# Patient Record
Sex: MALE | Race: WHITE | NOT HISPANIC OR LATINO | Employment: OTHER
[De-identification: names, ages, dates, MRNs, and addresses within clinical notes are randomized per-mention and may not be internally consistent; named-entity substitution may affect disease eponyms.]

---

## 2017-01-10 ENCOUNTER — RECORDS - HEALTHEAST (OUTPATIENT)
Dept: ADMINISTRATIVE | Facility: OTHER | Age: 82
End: 2017-01-10

## 2017-01-19 ENCOUNTER — RECORDS - HEALTHEAST (OUTPATIENT)
Dept: ADMINISTRATIVE | Facility: OTHER | Age: 82
End: 2017-01-19

## 2017-01-31 ENCOUNTER — RECORDS - HEALTHEAST (OUTPATIENT)
Dept: ADMINISTRATIVE | Facility: OTHER | Age: 82
End: 2017-01-31

## 2017-02-09 ENCOUNTER — RECORDS - HEALTHEAST (OUTPATIENT)
Dept: ADMINISTRATIVE | Facility: OTHER | Age: 82
End: 2017-02-09

## 2017-02-09 ENCOUNTER — TRANSFERRED RECORDS (OUTPATIENT)
Dept: HEALTH INFORMATION MANAGEMENT | Facility: CLINIC | Age: 82
End: 2017-02-09

## 2017-02-17 ENCOUNTER — OFFICE VISIT - HEALTHEAST (OUTPATIENT)
Dept: RADIATION ONCOLOGY | Age: 82
End: 2017-02-17

## 2017-02-17 DIAGNOSIS — C61 PROSTATE CANCER (H): ICD-10-CM

## 2017-02-17 RX ORDER — LEVOTHYROXINE SODIUM 137 UG/1
137 TABLET ORAL
Status: SHIPPED | COMMUNITY
Start: 2017-02-15

## 2017-02-17 RX ORDER — PROBENECID 500 MG/1
500 TABLET, FILM COATED ORAL DAILY
Status: SHIPPED | COMMUNITY
Start: 2016-08-01

## 2017-02-17 RX ORDER — WARFARIN SODIUM 2 MG/1
2 TABLET ORAL DAILY
Status: SHIPPED | COMMUNITY
Start: 2017-02-15

## 2017-02-17 RX ORDER — FUROSEMIDE 20 MG
40 TABLET ORAL EVERY MORNING
Status: SHIPPED | COMMUNITY
Start: 2017-01-23

## 2017-02-17 ASSESSMENT — MIFFLIN-ST. JEOR: SCORE: 1520.76

## 2018-07-30 ENCOUNTER — AMBULATORY - HEALTHEAST (OUTPATIENT)
Dept: ADMINISTRATIVE | Facility: CLINIC | Age: 83
End: 2018-07-30

## 2018-07-30 RX ORDER — ACETAMINOPHEN 500 MG
1000 TABLET ORAL EVERY 8 HOURS PRN
Status: SHIPPED | COMMUNITY
Start: 2018-07-30

## 2018-07-30 RX ORDER — METOPROLOL SUCCINATE 100 MG/1
100 TABLET, EXTENDED RELEASE ORAL DAILY
Status: SHIPPED | COMMUNITY
Start: 2018-07-30

## 2018-07-30 RX ORDER — AMOXICILLIN 250 MG
2 CAPSULE ORAL 2 TIMES DAILY PRN
Status: SHIPPED | COMMUNITY
Start: 2018-07-30

## 2018-07-31 ENCOUNTER — RECORDS - HEALTHEAST (OUTPATIENT)
Dept: LAB | Facility: CLINIC | Age: 83
End: 2018-07-31

## 2018-07-31 ENCOUNTER — OFFICE VISIT - HEALTHEAST (OUTPATIENT)
Dept: GERIATRICS | Facility: CLINIC | Age: 83
End: 2018-07-31

## 2018-07-31 DIAGNOSIS — Z86.39 H/O HYPERPARATHYROIDISM: ICD-10-CM

## 2018-07-31 DIAGNOSIS — D64.9 ANEMIA, UNSPECIFIED TYPE: ICD-10-CM

## 2018-07-31 DIAGNOSIS — E03.9 ACQUIRED HYPOTHYROIDISM: ICD-10-CM

## 2018-07-31 DIAGNOSIS — M1A.9XX0 CHRONIC GOUT WITHOUT TOPHUS, UNSPECIFIED CAUSE, UNSPECIFIED SITE: ICD-10-CM

## 2018-07-31 DIAGNOSIS — S72.401D CLOSED FRACTURE OF DISTAL END OF RIGHT FEMUR WITH ROUTINE HEALING, UNSPECIFIED FRACTURE MORPHOLOGY, SUBSEQUENT ENCOUNTER: ICD-10-CM

## 2018-07-31 DIAGNOSIS — I48.20 CHRONIC ATRIAL FIBRILLATION (H): ICD-10-CM

## 2018-07-31 DIAGNOSIS — K21.9 GASTROESOPHAGEAL REFLUX DISEASE, ESOPHAGITIS PRESENCE NOT SPECIFIED: ICD-10-CM

## 2018-07-31 DIAGNOSIS — E11.9 TYPE 2 DIABETES MELLITUS WITHOUT COMPLICATION, WITHOUT LONG-TERM CURRENT USE OF INSULIN (H): ICD-10-CM

## 2018-07-31 DIAGNOSIS — J84.10 PULMONARY FIBROSIS (H): ICD-10-CM

## 2018-07-31 DIAGNOSIS — Z95.0 PACEMAKER: ICD-10-CM

## 2018-07-31 DIAGNOSIS — I10 HYPERTENSION: ICD-10-CM

## 2018-07-31 DIAGNOSIS — C61 PROSTATE CANCER (H): ICD-10-CM

## 2018-07-31 LAB — HGB BLD-MCNC: 7.9 G/DL (ref 14–18)

## 2018-08-02 ENCOUNTER — OFFICE VISIT - HEALTHEAST (OUTPATIENT)
Dept: GERIATRICS | Facility: CLINIC | Age: 83
End: 2018-08-02

## 2018-08-02 DIAGNOSIS — I50.32 CHRONIC DIASTOLIC HEART FAILURE (H): ICD-10-CM

## 2018-08-02 DIAGNOSIS — E11.9 DIABETES MELLITUS, TYPE 2 (H): ICD-10-CM

## 2018-08-02 DIAGNOSIS — R53.1 GENERALIZED WEAKNESS: ICD-10-CM

## 2018-08-02 DIAGNOSIS — K58.2 IRRITABLE BOWEL SYNDROME WITH BOTH CONSTIPATION AND DIARRHEA: ICD-10-CM

## 2018-08-02 DIAGNOSIS — I10 HYPERTENSION: ICD-10-CM

## 2018-08-02 DIAGNOSIS — Z95.0 PACEMAKER: ICD-10-CM

## 2018-08-02 DIAGNOSIS — S72.491A: ICD-10-CM

## 2018-08-02 DIAGNOSIS — R53.81 PHYSICAL DECONDITIONING: ICD-10-CM

## 2018-08-02 DIAGNOSIS — E03.9 HYPOTHYROIDISM: ICD-10-CM

## 2018-08-02 DIAGNOSIS — I48.20 CHRONIC ATRIAL FIBRILLATION (H): ICD-10-CM

## 2018-08-02 DIAGNOSIS — E21.3 HYPERPARATHYROIDISM (H): ICD-10-CM

## 2018-08-02 RX ORDER — LOPERAMIDE HCL 2 MG
2 CAPSULE ORAL 4 TIMES DAILY PRN
Status: SHIPPED | COMMUNITY
Start: 2018-08-02

## 2018-08-06 ENCOUNTER — OFFICE VISIT - HEALTHEAST (OUTPATIENT)
Dept: GERIATRICS | Facility: CLINIC | Age: 83
End: 2018-08-06

## 2018-08-06 ENCOUNTER — RECORDS - HEALTHEAST (OUTPATIENT)
Dept: LAB | Facility: CLINIC | Age: 83
End: 2018-08-06

## 2018-08-06 DIAGNOSIS — Z95.0 PACEMAKER: ICD-10-CM

## 2018-08-06 DIAGNOSIS — Z79.01 ANTICOAGULATED ON COUMADIN: ICD-10-CM

## 2018-08-06 DIAGNOSIS — D62 ACUTE BLOOD LOSS ANEMIA: ICD-10-CM

## 2018-08-06 DIAGNOSIS — E03.9 HYPOTHYROIDISM: ICD-10-CM

## 2018-08-06 DIAGNOSIS — I48.20 CHRONIC ATRIAL FIBRILLATION (H): ICD-10-CM

## 2018-08-06 DIAGNOSIS — I10 HYPERTENSION: ICD-10-CM

## 2018-08-06 DIAGNOSIS — I50.32 CHRONIC DIASTOLIC HEART FAILURE (H): ICD-10-CM

## 2018-08-06 DIAGNOSIS — E21.3 HYPERPARATHYROIDISM (H): ICD-10-CM

## 2018-08-06 DIAGNOSIS — M10.9 GOUT: ICD-10-CM

## 2018-08-06 DIAGNOSIS — R53.81 PHYSICAL DECONDITIONING: ICD-10-CM

## 2018-08-06 DIAGNOSIS — S72.491A: ICD-10-CM

## 2018-08-06 DIAGNOSIS — E11.9 DIABETES MELLITUS, TYPE 2 (H): ICD-10-CM

## 2018-08-07 ENCOUNTER — OFFICE VISIT - HEALTHEAST (OUTPATIENT)
Dept: GERIATRICS | Facility: CLINIC | Age: 83
End: 2018-08-07

## 2018-08-07 DIAGNOSIS — C61 PROSTATE CANCER (H): ICD-10-CM

## 2018-08-07 DIAGNOSIS — E11.9 TYPE 2 DIABETES MELLITUS WITHOUT COMPLICATION, WITHOUT LONG-TERM CURRENT USE OF INSULIN (H): ICD-10-CM

## 2018-08-07 DIAGNOSIS — I10 ESSENTIAL HYPERTENSION: ICD-10-CM

## 2018-08-07 DIAGNOSIS — D62 ACUTE BLOOD LOSS ANEMIA: ICD-10-CM

## 2018-08-07 DIAGNOSIS — Z95.0 PACEMAKER: ICD-10-CM

## 2018-08-07 DIAGNOSIS — S72.401D CLOSED FRACTURE OF DISTAL END OF RIGHT FEMUR WITH ROUTINE HEALING, UNSPECIFIED FRACTURE MORPHOLOGY, SUBSEQUENT ENCOUNTER: ICD-10-CM

## 2018-08-07 LAB
ANION GAP SERPL CALCULATED.3IONS-SCNC: 7 MMOL/L (ref 5–18)
BASOPHILS # BLD AUTO: 0 THOU/UL (ref 0–0.2)
BASOPHILS NFR BLD AUTO: 1 % (ref 0–2)
BUN SERPL-MCNC: 21 MG/DL (ref 8–28)
CALCIUM SERPL-MCNC: 11 MG/DL (ref 8.5–10.5)
CHLORIDE BLD-SCNC: 109 MMOL/L (ref 98–107)
CO2 SERPL-SCNC: 25 MMOL/L (ref 22–31)
CREAT SERPL-MCNC: 1.12 MG/DL (ref 0.7–1.3)
EOSINOPHIL # BLD AUTO: 0.2 THOU/UL (ref 0–0.4)
EOSINOPHIL NFR BLD AUTO: 2 % (ref 0–6)
ERYTHROCYTE [DISTWIDTH] IN BLOOD BY AUTOMATED COUNT: 16.3 % (ref 11–14.5)
GFR SERPL CREATININE-BSD FRML MDRD: >60 ML/MIN/1.73M2
GLUCOSE BLD-MCNC: 133 MG/DL (ref 70–125)
HCT VFR BLD AUTO: 30.5 % (ref 40–54)
HGB BLD-MCNC: 9.3 G/DL (ref 14–18)
LYMPHOCYTES # BLD AUTO: 0.6 THOU/UL (ref 0.8–4.4)
LYMPHOCYTES NFR BLD AUTO: 7 % (ref 20–40)
MCH RBC QN AUTO: 31 PG (ref 27–34)
MCHC RBC AUTO-ENTMCNC: 30.5 G/DL (ref 32–36)
MCV RBC AUTO: 102 FL (ref 80–100)
MONOCYTES # BLD AUTO: 0.8 THOU/UL (ref 0–0.9)
MONOCYTES NFR BLD AUTO: 9 % (ref 2–10)
NEUTROPHILS # BLD AUTO: 7 THOU/UL (ref 2–7.7)
NEUTROPHILS NFR BLD AUTO: 81 % (ref 50–70)
PLATELET # BLD AUTO: 302 THOU/UL (ref 140–440)
PMV BLD AUTO: 10.7 FL (ref 8.5–12.5)
POTASSIUM BLD-SCNC: 3.6 MMOL/L (ref 3.5–5)
RBC # BLD AUTO: 3 MILL/UL (ref 4.4–6.2)
SODIUM SERPL-SCNC: 141 MMOL/L (ref 136–145)
WBC: 8.7 THOU/UL (ref 4–11)

## 2018-08-09 ENCOUNTER — OFFICE VISIT - HEALTHEAST (OUTPATIENT)
Dept: GERIATRICS | Facility: CLINIC | Age: 83
End: 2018-08-09

## 2018-08-09 DIAGNOSIS — E11.9 DIABETES MELLITUS, TYPE 2 (H): ICD-10-CM

## 2018-08-09 DIAGNOSIS — I47.19 ATRIAL TACHYCARDIA (H): ICD-10-CM

## 2018-08-09 DIAGNOSIS — S72.491A: ICD-10-CM

## 2018-08-09 DIAGNOSIS — R53.81 PHYSICAL DECONDITIONING: ICD-10-CM

## 2018-08-09 DIAGNOSIS — I10 ESSENTIAL HYPERTENSION: ICD-10-CM

## 2018-08-09 DIAGNOSIS — E21.3 HYPERPARATHYROIDISM (H): ICD-10-CM

## 2018-08-09 DIAGNOSIS — I50.32 CHRONIC DIASTOLIC HEART FAILURE (H): ICD-10-CM

## 2018-08-09 DIAGNOSIS — D62 ACUTE BLOOD LOSS ANEMIA: ICD-10-CM

## 2018-08-09 RX ORDER — AMLODIPINE BESYLATE 2.5 MG/1
2.5 TABLET ORAL DAILY
Status: SHIPPED | COMMUNITY
Start: 2018-08-07

## 2018-08-10 ENCOUNTER — AMBULATORY - HEALTHEAST (OUTPATIENT)
Dept: GERIATRICS | Facility: CLINIC | Age: 83
End: 2018-08-10

## 2018-08-23 ENCOUNTER — RECORDS - HEALTHEAST (OUTPATIENT)
Dept: LAB | Facility: CLINIC | Age: 83
End: 2018-08-23

## 2018-08-23 LAB
ANION GAP SERPL CALCULATED.3IONS-SCNC: 7 MMOL/L (ref 5–18)
BUN SERPL-MCNC: 15 MG/DL (ref 8–28)
CALCIUM SERPL-MCNC: 11.4 MG/DL (ref 8.5–10.5)
CHLORIDE BLD-SCNC: 107 MMOL/L (ref 98–107)
CO2 SERPL-SCNC: 24 MMOL/L (ref 22–31)
CREAT SERPL-MCNC: 0.95 MG/DL (ref 0.7–1.3)
GFR SERPL CREATININE-BSD FRML MDRD: >60 ML/MIN/1.73M2
GLUCOSE BLD-MCNC: 108 MG/DL (ref 70–125)
POTASSIUM BLD-SCNC: 3.8 MMOL/L (ref 3.5–5)
SODIUM SERPL-SCNC: 138 MMOL/L (ref 136–145)

## 2018-09-04 ENCOUNTER — RECORDS - HEALTHEAST (OUTPATIENT)
Dept: LAB | Facility: CLINIC | Age: 83
End: 2018-09-04

## 2018-09-04 LAB
ALBUMIN UR-MCNC: ABNORMAL MG/DL
ANION GAP SERPL CALCULATED.3IONS-SCNC: 10 MMOL/L (ref 5–18)
APPEARANCE UR: ABNORMAL
BACTERIA #/AREA URNS HPF: ABNORMAL HPF
BASOPHILS # BLD AUTO: 0.1 THOU/UL (ref 0–0.2)
BASOPHILS NFR BLD AUTO: 1 % (ref 0–2)
BILIRUB UR QL STRIP: NEGATIVE
BUN SERPL-MCNC: 23 MG/DL (ref 8–28)
CALCIUM SERPL-MCNC: 12.9 MG/DL (ref 8.5–10.5)
CAOX CRY #/AREA URNS HPF: PRESENT /[HPF]
CHLORIDE BLD-SCNC: 105 MMOL/L (ref 98–107)
CO2 SERPL-SCNC: 26 MMOL/L (ref 22–31)
COLOR UR AUTO: YELLOW
CREAT SERPL-MCNC: 1 MG/DL (ref 0.7–1.3)
EOSINOPHIL # BLD AUTO: 0.1 THOU/UL (ref 0–0.4)
EOSINOPHIL NFR BLD AUTO: 1 % (ref 0–6)
ERYTHROCYTE [DISTWIDTH] IN BLOOD BY AUTOMATED COUNT: 14.4 % (ref 11–14.5)
GFR SERPL CREATININE-BSD FRML MDRD: >60 ML/MIN/1.73M2
GLUCOSE BLD-MCNC: 137 MG/DL (ref 70–125)
GLUCOSE UR STRIP-MCNC: NEGATIVE MG/DL
HCT VFR BLD AUTO: 39.6 % (ref 40–54)
HGB BLD-MCNC: 12.5 G/DL (ref 14–18)
HGB UR QL STRIP: ABNORMAL
HYALINE CASTS #/AREA URNS LPF: ABNORMAL LPF
KETONES UR STRIP-MCNC: NEGATIVE MG/DL
LEUKOCYTE ESTERASE UR QL STRIP: ABNORMAL
LYMPHOCYTES # BLD AUTO: 0.9 THOU/UL (ref 0.8–4.4)
LYMPHOCYTES NFR BLD AUTO: 10 % (ref 20–40)
MCH RBC QN AUTO: 31.5 PG (ref 27–34)
MCHC RBC AUTO-ENTMCNC: 31.6 G/DL (ref 32–36)
MCV RBC AUTO: 100 FL (ref 80–100)
MONOCYTES # BLD AUTO: 0.5 THOU/UL (ref 0–0.9)
MONOCYTES NFR BLD AUTO: 7 % (ref 2–10)
MUCOUS THREADS #/AREA URNS LPF: ABNORMAL LPF
NEUTROPHILS # BLD AUTO: 6.8 THOU/UL (ref 2–7.7)
NEUTROPHILS NFR BLD AUTO: 82 % (ref 50–70)
NITRATE UR QL: POSITIVE
PH UR STRIP: 5.5 [PH] (ref 4.5–8)
PLATELET # BLD AUTO: 187 THOU/UL (ref 140–440)
PMV BLD AUTO: 11.7 FL (ref 8.5–12.5)
POTASSIUM BLD-SCNC: 3.5 MMOL/L (ref 3.5–5)
RBC # BLD AUTO: 3.97 MILL/UL (ref 4.4–6.2)
RBC #/AREA URNS AUTO: ABNORMAL HPF
SODIUM SERPL-SCNC: 141 MMOL/L (ref 136–145)
SP GR UR STRIP: 1.02 (ref 1–1.03)
SQUAMOUS #/AREA URNS AUTO: ABNORMAL LPF
UROBILINOGEN UR STRIP-ACNC: ABNORMAL
WBC #/AREA URNS AUTO: >100 HPF
WBC CLUMPS #/AREA URNS HPF: PRESENT /[HPF]
WBC: 8.3 THOU/UL (ref 4–11)

## 2018-09-06 LAB — BACTERIA SPEC CULT: ABNORMAL

## 2018-09-19 ENCOUNTER — RECORDS - HEALTHEAST (OUTPATIENT)
Dept: LAB | Facility: CLINIC | Age: 83
End: 2018-09-19

## 2018-09-19 LAB
ANION GAP SERPL CALCULATED.3IONS-SCNC: 6 MMOL/L (ref 5–18)
BUN SERPL-MCNC: 17 MG/DL (ref 8–28)
CALCIUM SERPL-MCNC: 9.4 MG/DL (ref 8.5–10.5)
CALCIUM, IONIZED MEASURED: 1.31 MMOL/L (ref 1.11–1.3)
CHLORIDE BLD-SCNC: 111 MMOL/L (ref 98–107)
CO2 SERPL-SCNC: 22 MMOL/L (ref 22–31)
CREAT SERPL-MCNC: 0.94 MG/DL (ref 0.7–1.3)
GFR SERPL CREATININE-BSD FRML MDRD: >60 ML/MIN/1.73M2
GLUCOSE BLD-MCNC: 130 MG/DL (ref 70–125)
ION CA PH 7.4: 1.27 MMOL/L (ref 1.11–1.3)
PH: 7.33 (ref 7.35–7.45)
POTASSIUM BLD-SCNC: 3.5 MMOL/L (ref 3.5–5)
SODIUM SERPL-SCNC: 139 MMOL/L (ref 136–145)

## 2018-09-21 ENCOUNTER — RECORDS - HEALTHEAST (OUTPATIENT)
Dept: LAB | Facility: CLINIC | Age: 83
End: 2018-09-21

## 2018-09-21 LAB — 25(OH)D3 SERPL-MCNC: 10.5 NG/ML (ref 30–80)

## 2018-10-02 ENCOUNTER — RECORDS - HEALTHEAST (OUTPATIENT)
Dept: LAB | Facility: CLINIC | Age: 83
End: 2018-10-02

## 2018-10-02 LAB
ANION GAP SERPL CALCULATED.3IONS-SCNC: 5 MMOL/L (ref 5–18)
BUN SERPL-MCNC: 12 MG/DL (ref 8–28)
CALCIUM SERPL-MCNC: 9.7 MG/DL (ref 8.5–10.5)
CALCIUM, IONIZED MEASURED: 1.35 MMOL/L (ref 1.11–1.3)
CHLORIDE BLD-SCNC: 110 MMOL/L (ref 98–107)
CO2 SERPL-SCNC: 26 MMOL/L (ref 22–31)
CREAT SERPL-MCNC: 0.71 MG/DL (ref 0.7–1.3)
GFR SERPL CREATININE-BSD FRML MDRD: >60 ML/MIN/1.73M2
GLUCOSE BLD-MCNC: 107 MG/DL (ref 70–125)
HGB BLD-MCNC: 10.2 G/DL (ref 14–18)
ION CA PH 7.4: 1.36 MMOL/L (ref 1.11–1.3)
PH: 7.42 (ref 7.35–7.45)
POTASSIUM BLD-SCNC: 3.6 MMOL/L (ref 3.5–5)
SODIUM SERPL-SCNC: 141 MMOL/L (ref 136–145)

## 2018-10-09 ENCOUNTER — RECORDS - HEALTHEAST (OUTPATIENT)
Dept: LAB | Facility: CLINIC | Age: 83
End: 2018-10-09

## 2018-10-09 LAB
ANION GAP SERPL CALCULATED.3IONS-SCNC: 4 MMOL/L (ref 5–18)
BUN SERPL-MCNC: 14 MG/DL (ref 8–28)
CALCIUM SERPL-MCNC: 10.2 MG/DL (ref 8.5–10.5)
CHLORIDE BLD-SCNC: 108 MMOL/L (ref 98–107)
CO2 SERPL-SCNC: 28 MMOL/L (ref 22–31)
CREAT SERPL-MCNC: 0.92 MG/DL (ref 0.7–1.3)
GFR SERPL CREATININE-BSD FRML MDRD: >60 ML/MIN/1.73M2
GLUCOSE BLD-MCNC: 140 MG/DL (ref 70–125)
POTASSIUM BLD-SCNC: 3.8 MMOL/L (ref 3.5–5)
SODIUM SERPL-SCNC: 140 MMOL/L (ref 136–145)

## 2018-10-18 ENCOUNTER — RECORDS - HEALTHEAST (OUTPATIENT)
Dept: LAB | Facility: CLINIC | Age: 83
End: 2018-10-18

## 2018-10-18 LAB
ALBUMIN SERPL-MCNC: 3 G/DL (ref 3.5–5)
ALBUMIN UR-MCNC: ABNORMAL MG/DL
ANION GAP SERPL CALCULATED.3IONS-SCNC: 9 MMOL/L (ref 5–18)
APPEARANCE UR: CLEAR
BACTERIA #/AREA URNS HPF: ABNORMAL HPF
BASOPHILS # BLD AUTO: 0.1 THOU/UL (ref 0–0.2)
BASOPHILS NFR BLD AUTO: 1 % (ref 0–2)
BILIRUB UR QL STRIP: NEGATIVE
BUN SERPL-MCNC: 12 MG/DL (ref 8–28)
CALCIUM SERPL-MCNC: 10.6 MG/DL (ref 8.5–10.5)
CALCIUM SERPL-MCNC: 10.6 MG/DL (ref 8.5–10.5)
CHLORIDE BLD-SCNC: 105 MMOL/L (ref 98–107)
CO2 SERPL-SCNC: 24 MMOL/L (ref 22–31)
COLOR UR AUTO: YELLOW
CREAT SERPL-MCNC: 0.92 MG/DL (ref 0.7–1.3)
EOSINOPHIL # BLD AUTO: 0.1 THOU/UL (ref 0–0.4)
EOSINOPHIL NFR BLD AUTO: 2 % (ref 0–6)
ERYTHROCYTE [DISTWIDTH] IN BLOOD BY AUTOMATED COUNT: 15.1 % (ref 11–14.5)
GFR SERPL CREATININE-BSD FRML MDRD: >60 ML/MIN/1.73M2
GLUCOSE BLD-MCNC: 98 MG/DL (ref 70–125)
GLUCOSE UR STRIP-MCNC: NEGATIVE MG/DL
HCT VFR BLD AUTO: 36.1 % (ref 40–54)
HGB BLD-MCNC: 11.2 G/DL (ref 14–18)
HGB UR QL STRIP: NEGATIVE
HYALINE CASTS #/AREA URNS LPF: ABNORMAL LPF
KETONES UR STRIP-MCNC: NEGATIVE MG/DL
LEUKOCYTE ESTERASE UR QL STRIP: NEGATIVE
LYMPHOCYTES # BLD AUTO: 0.6 THOU/UL (ref 0.8–4.4)
LYMPHOCYTES NFR BLD AUTO: 10 % (ref 20–40)
MCH RBC QN AUTO: 30.6 PG (ref 27–34)
MCHC RBC AUTO-ENTMCNC: 31 G/DL (ref 32–36)
MCV RBC AUTO: 99 FL (ref 80–100)
MONOCYTES # BLD AUTO: 0.5 THOU/UL (ref 0–0.9)
MONOCYTES NFR BLD AUTO: 8 % (ref 2–10)
NEUTROPHILS # BLD AUTO: 4.6 THOU/UL (ref 2–7.7)
NEUTROPHILS NFR BLD AUTO: 80 % (ref 50–70)
NITRATE UR QL: NEGATIVE
PH UR STRIP: 6 [PH] (ref 4.5–8)
PHOSPHATE SERPL-MCNC: 2.1 MG/DL (ref 2.5–4.5)
PLATELET # BLD AUTO: 195 THOU/UL (ref 140–440)
PMV BLD AUTO: 10.7 FL (ref 8.5–12.5)
POTASSIUM BLD-SCNC: 3.6 MMOL/L (ref 3.5–5)
RBC # BLD AUTO: 3.66 MILL/UL (ref 4.4–6.2)
RBC #/AREA URNS AUTO: ABNORMAL HPF
SODIUM SERPL-SCNC: 138 MMOL/L (ref 136–145)
SP GR UR STRIP: 1.02 (ref 1–1.03)
SQUAMOUS #/AREA URNS AUTO: ABNORMAL LPF
UROBILINOGEN UR STRIP-ACNC: ABNORMAL
WBC #/AREA URNS AUTO: ABNORMAL HPF
WBC: 5.8 THOU/UL (ref 4–11)

## 2018-10-19 LAB — BACTERIA SPEC CULT: NO GROWTH

## 2018-10-24 ENCOUNTER — RECORDS - HEALTHEAST (OUTPATIENT)
Dept: LAB | Facility: CLINIC | Age: 83
End: 2018-10-24

## 2018-10-25 LAB
ANION GAP SERPL CALCULATED.3IONS-SCNC: 6 MMOL/L (ref 5–18)
BUN SERPL-MCNC: 12 MG/DL (ref 8–28)
CALCIUM SERPL-MCNC: 10.5 MG/DL (ref 8.5–10.5)
CHLORIDE BLD-SCNC: 105 MMOL/L (ref 98–107)
CO2 SERPL-SCNC: 25 MMOL/L (ref 22–31)
CREAT SERPL-MCNC: 0.94 MG/DL (ref 0.7–1.3)
GFR SERPL CREATININE-BSD FRML MDRD: >60 ML/MIN/1.73M2
GLUCOSE BLD-MCNC: 120 MG/DL (ref 70–125)
POTASSIUM BLD-SCNC: 3.5 MMOL/L (ref 3.5–5)
SODIUM SERPL-SCNC: 136 MMOL/L (ref 136–145)

## 2018-12-07 ENCOUNTER — RECORDS - HEALTHEAST (OUTPATIENT)
Dept: LAB | Facility: CLINIC | Age: 83
End: 2018-12-07

## 2018-12-07 LAB
ALBUMIN SERPL-MCNC: 2.8 G/DL (ref 3.5–5)
ALBUMIN SERPL-MCNC: 2.8 G/DL (ref 3.5–5)
ALBUMIN UR-MCNC: NEGATIVE MG/DL
ALP SERPL-CCNC: 98 U/L (ref 45–120)
ALP SERPL-CCNC: 98 U/L (ref 45–120)
ALT SERPL W P-5'-P-CCNC: 14 U/L (ref 0–45)
ALT SERPL W P-5'-P-CCNC: 14 U/L (ref 0–45)
ANION GAP SERPL CALCULATED.3IONS-SCNC: 8 MMOL/L (ref 5–18)
APPEARANCE UR: ABNORMAL
AST SERPL W P-5'-P-CCNC: 16 U/L (ref 0–40)
AST SERPL W P-5'-P-CCNC: 16 U/L (ref 0–40)
BACTERIA #/AREA URNS HPF: ABNORMAL HPF
BILIRUB DIRECT SERPL-MCNC: 0.7 MG/DL
BILIRUB SERPL-MCNC: 1.7 MG/DL (ref 0–1)
BILIRUB SERPL-MCNC: 1.7 MG/DL (ref 0–1)
BILIRUB UR QL STRIP: NEGATIVE
BUN SERPL-MCNC: 11 MG/DL (ref 8–28)
CALCIUM SERPL-MCNC: 11.1 MG/DL (ref 8.5–10.5)
CAOX CRY #/AREA URNS HPF: PRESENT /[HPF]
CHLORIDE BLD-SCNC: 103 MMOL/L (ref 98–107)
CO2 SERPL-SCNC: 27 MMOL/L (ref 22–31)
COLOR UR AUTO: YELLOW
CREAT SERPL-MCNC: 0.77 MG/DL (ref 0.7–1.3)
ERYTHROCYTE [DISTWIDTH] IN BLOOD BY AUTOMATED COUNT: 14.1 % (ref 11–14.5)
GFR SERPL CREATININE-BSD FRML MDRD: >60 ML/MIN/1.73M2
GLUCOSE BLD-MCNC: 131 MG/DL (ref 70–125)
GLUCOSE UR STRIP-MCNC: NEGATIVE MG/DL
HCT VFR BLD AUTO: 39 % (ref 40–54)
HGB BLD-MCNC: 12.7 G/DL (ref 14–18)
HGB UR QL STRIP: ABNORMAL
HYALINE CASTS #/AREA URNS LPF: ABNORMAL LPF
KETONES UR STRIP-MCNC: NEGATIVE MG/DL
LEUKOCYTE ESTERASE UR QL STRIP: NEGATIVE
MCH RBC QN AUTO: 31.9 PG (ref 27–34)
MCHC RBC AUTO-ENTMCNC: 32.6 G/DL (ref 32–36)
MCV RBC AUTO: 98 FL (ref 80–100)
MUCOUS THREADS #/AREA URNS LPF: ABNORMAL LPF
NITRATE UR QL: NEGATIVE
PH UR STRIP: 6 [PH] (ref 4.5–8)
PLATELET # BLD AUTO: 167 THOU/UL (ref 140–440)
PMV BLD AUTO: 11.9 FL (ref 8.5–12.5)
POTASSIUM BLD-SCNC: 2.9 MMOL/L (ref 3.5–5)
PROT SERPL-MCNC: 6.1 G/DL (ref 6–8)
PROT SERPL-MCNC: 6.1 G/DL (ref 6–8)
RBC # BLD AUTO: 3.98 MILL/UL (ref 4.4–6.2)
RBC #/AREA URNS AUTO: ABNORMAL HPF
SODIUM SERPL-SCNC: 138 MMOL/L (ref 136–145)
SP GR UR STRIP: 1.01 (ref 1–1.03)
SQUAMOUS #/AREA URNS AUTO: ABNORMAL LPF
UROBILINOGEN UR STRIP-ACNC: ABNORMAL
WBC #/AREA URNS AUTO: ABNORMAL HPF
WBC: 7.5 THOU/UL (ref 4–11)

## 2018-12-08 LAB — BACTERIA SPEC CULT: NO GROWTH

## 2018-12-11 ENCOUNTER — RECORDS - HEALTHEAST (OUTPATIENT)
Dept: LAB | Facility: CLINIC | Age: 83
End: 2018-12-11

## 2018-12-11 LAB
ANION GAP SERPL CALCULATED.3IONS-SCNC: 7 MMOL/L (ref 5–18)
BUN SERPL-MCNC: 12 MG/DL (ref 8–28)
CALCIUM SERPL-MCNC: 12.1 MG/DL (ref 8.5–10.5)
CHLORIDE BLD-SCNC: 109 MMOL/L (ref 98–107)
CO2 SERPL-SCNC: 26 MMOL/L (ref 22–31)
CREAT SERPL-MCNC: 0.73 MG/DL (ref 0.7–1.3)
GFR SERPL CREATININE-BSD FRML MDRD: >60 ML/MIN/1.73M2
GLUCOSE BLD-MCNC: 80 MG/DL (ref 70–125)
POTASSIUM BLD-SCNC: 4.3 MMOL/L (ref 3.5–5)
SODIUM SERPL-SCNC: 142 MMOL/L (ref 136–145)

## 2021-05-30 VITALS — HEIGHT: 70 IN | BODY MASS INDEX: 27.92 KG/M2 | WEIGHT: 195 LBS

## 2021-06-01 VITALS — BODY MASS INDEX: 27.69 KG/M2 | WEIGHT: 193 LBS

## 2021-06-01 VITALS — BODY MASS INDEX: 27.84 KG/M2 | WEIGHT: 194 LBS

## 2021-06-01 VITALS — BODY MASS INDEX: 27.98 KG/M2 | WEIGHT: 195 LBS

## 2021-06-08 NOTE — PROGRESS NOTES
Pt arrived ambulatory with use of cane accompanied by his spouse for consultation with Dr. Cohen.  He is here for discussion of CK tx for prostate cancer.  He reports urinary frequency and one episode of hematuria.  He denies any other changes in bowel or bladder.  Please see nrsg flow sheet for full assessment.  Pt was given new patient folder which included welcome letter, MD bio cards, writer's business card, managing fatigue leaflet, Danny Understanding RT, CK Accuray booklet, and CK pathway.  Pathway was explained in detail and pt and spouse verbalized their understanding.  They were encouraged to call CK RN line with any questions, concerns, or decisions.  Pt and spouse met with Dr. Cohen and he is considering his options at this time.  He will call if he wishes to proceed with tx.

## 2021-06-15 PROBLEM — C61 PROSTATE CANCER (H): Status: ACTIVE | Noted: 2017-02-17

## 2021-06-16 PROBLEM — M35.3 POLYMYALGIA RHEUMATICA (H): Status: ACTIVE | Noted: 2018-08-02

## 2021-06-16 PROBLEM — Z79.01 ANTICOAGULATED ON COUMADIN: Status: ACTIVE | Noted: 2018-07-22

## 2021-06-16 PROBLEM — M10.9 GOUT: Status: ACTIVE | Noted: 2018-08-02

## 2021-06-16 PROBLEM — D62 ACUTE BLOOD LOSS ANEMIA: Status: ACTIVE | Noted: 2018-07-28

## 2021-06-16 PROBLEM — I10 HYPERTENSION: Status: ACTIVE | Noted: 2018-08-02

## 2021-06-16 PROBLEM — E11.9 DIABETES MELLITUS, TYPE 2 (H): Status: ACTIVE | Noted: 2018-08-02

## 2021-06-16 PROBLEM — E03.9 HYPOTHYROIDISM: Status: ACTIVE | Noted: 2018-08-02

## 2021-06-16 PROBLEM — S72.491A: Status: ACTIVE | Noted: 2018-07-22

## 2021-06-16 PROBLEM — R00.1 BRADYCARDIA: Status: ACTIVE | Noted: 2018-07-22

## 2021-06-16 PROBLEM — R53.1 GENERALIZED WEAKNESS: Status: ACTIVE | Noted: 2018-06-04

## 2021-06-16 PROBLEM — I48.20 CHRONIC ATRIAL FIBRILLATION (H): Status: ACTIVE | Noted: 2018-07-22

## 2021-06-16 PROBLEM — K21.9 GERD (GASTROESOPHAGEAL REFLUX DISEASE): Status: ACTIVE | Noted: 2018-08-02

## 2021-06-16 PROBLEM — K58.9 IBS (IRRITABLE BOWEL SYNDROME): Status: ACTIVE | Noted: 2018-08-02

## 2021-06-16 PROBLEM — R53.81 PHYSICAL DECONDITIONING: Status: ACTIVE | Noted: 2018-08-02

## 2021-06-16 PROBLEM — Z95.0 PACEMAKER: Status: ACTIVE | Noted: 2018-08-02

## 2021-06-19 NOTE — PROGRESS NOTES
[facility]    DATE: 2018    NAME:  Hong Corley             :  1924  MRN: [id number]  CODE STATUS:  FULL CODE    VISIT TYPE: Discharge Summary     FACILITY:  [facility] [[id number]]       CHIEF COMPLAIN/REASON FOR VISIT:    Chief Complaint   Patient presents with     Discharge Summary               HISTORY OF PRESENT ILLNESS: Hong Corley is a 89 y.o. male Who was admitted to [facility]   -  for Fall and Right femur fracture.  On  he had significant bradycardia and was taken for urgent PPM placement by cardiology on .  On  he went to the OR for right knee ORIF.  He did have acute blood loss anemia postop with hemoglobin in 7 range.  He has PMH of HTN, HLD, type II DM managed with diet, atrial fibrillation, on chronic warfarin, hyperparathyroidism, thrombocytopenia, PMR, osteoarthritis GERD, DJD.  Prior to this he lived at home with his wife at [facility]. He is a retired physician.     TCU course:  Mr. Corley as made some progress with therapy but remains max assist of 2 for bed mobility, Navin lift for transfers, sitting balance min/mod upper body dressing set up, total assist for lower body dressing and cares.  Total assist for toileting. Therapy was recommending 3 - 4 weeks.  His pain has been controlled with Tylenol although a dose was scheduled for the a.m.prior to therapy. His blood sugars have been controlled 124 - 131. His vitals have been stable  He had a follow-up with ortho on  and x-rays showed stable fracture and continue nonweightbearing.  They did give okay for passive and active range of motion and PT exercises twice-daily with strengthening. He is to follow up again on .  His bowels have been moving regularly and weights are trending down 198 - 193 LBS. He remains on Lasix daily but he did report that is weight in the hospital was much higher than his baseline. He had labs drawn on  which showed  improvement in his hemoglobin to 9.3 other labs were stable. His wife has been renting an apartment in the [facility] living area so that she can be close to him.  However for unclear reasons his daughter is demanding he be transferred today to [facility].        REVIEW OF SYSTEMS:  PROBLEMS AND REVIEW OF SYSTEMS:   Review of Systems  Today on ROS:  Currently, no fever, chills, or rigors. Decreased vision and hearing. Denies any chest pain, headaches, palpitations, lightheadedness, dizziness, shortness of breath, or cough. Appetite is good. Denies any GERD symptoms. Denies any difficulty with swallowing, nausea, or vomiting.  Denies any abdominal pain, diarrhea or constipation. Denies any urinary symptoms. No insomnia. No active bleeding. No rash. Positive for right leg incision, knee immobilizer, right knee and leg pain-Controlled      Allergies   Allergen Reactions     Losartan Rash     Low platelets (severe)     Current Outpatient Prescriptions   Medication Sig     acetaminophen (TYLENOL) 500 MG tablet Take 1,000 mg by mouth every 8 (eight) hours as needed for pain. At 0600 scheduled     amLODIPine (NORVASC) 2.5 MG tablet Take 2.5 mg by mouth daily.     furosemide (LASIX) 20 MG tablet Take 40 mg by mouth every morning.     levothyroxine (SYNTHROID, LEVOTHROID) 137 MCG tablet Take 137 mcg by mouth Daily before breakfast.     loperamide (IMODIUM) 2 mg capsule Take 2 mg by mouth 4 (four) times a day as needed for diarrhea.     metoprolol succinate (TOPROL-XL) 100 MG 24 hr tablet Take 100 mg by mouth daily.     omeprazole (PRILOSEC) 20 MG capsule Take 40 mg by mouth every other day.      probenecid (BENEMID) 500 mg tablet Take 500 mg by mouth daily.     senna-docusate (SENNOSIDES-DOCUSATE SODIUM) 8.6-50 mg tablet Take 2 tablets by mouth 2 (two) times a day as needed for constipation.     warfarin (COUMADIN) 2 MG tablet Take 2 mg by mouth daily.      Past Medical History:    Past Medical History:    Diagnosis Date     Aortic stenosis      Atrial fibrillation (H)      Atrial tachycardia (H)      Bladder tumor      BPH (benign prostatic hyperplasia)      Bradycardia      CHF (congestive heart failure) (H)      Closed fracture of right distal femur (H)      Colon polyps      Diabetes mellitus (H)      DJD (degenerative joint disease)      GERD (gastroesophageal reflux disease)      Gout      Hx of polymyalgia rheumatica      Hyperlipidemia      Hyperparathyroidism (H)      Hypertension      Hypothyroidism      Meningioma (H)     Spine     Monoclonal gammopathy of undetermined significance      Peripheral neuropathy (H)      Prostate cancer (H)      Pulmonary fibrosis (H)      Sinus arrest      Thrombocytopenia (H)     secondary to losartan           PHYSICAL EXAMINATION  Vitals:    08/08/18 2201   BP: 92/53   Pulse: 60   Resp: 18   Temp: 96.6  F (35.9  C)   SpO2: 98%   Weight: 193 lb (87.5 kg)       Today on physical exam:    GENERAL: Awake, Alert, oriented x3, not in any form of acute distress, answers questions appropriately, follows simple commands, conversant  HEENT: Head is normocephalic with normal hair distribution. No evidence of trauma. Ears: No acute purulent discharge. Eyes: Conjunctivae pink with no scleral jaundice. Nose: Normal mucosa and septum. NECK: Supple with no cervical or supraclavicular lymphadenopathy. Trachea is midline.   CHEST: No tenderness or deformity, no crepitus  LUNG: dim to auscultation with good chest expansion. There are no crackles or wheezes, normal AP diameter.  BACK: No kyphosis of the thoracic spine. Symmetric, no curvature, ROM normal, no CVA tenderness, no spinal tenderness   CVS: There is good S1  S2, regular rhythm, there are no murmurs, rubs, gallops, or heaves,  2+ pulses symmetric in all extremities.  ABDOMEN: Rounded and soft, nontender to palpation, non distended, no masses, no organomegaly, good bowel sounds, no rebound or guarding, no peritoneal signs.    EXTREMITIES: No pedal edema, right leg incision C/D/I, knee immobilizer in place  SKIN: Warm and dry, no erythema noted.  Skin color, texture, no rashes or lesions.  NEUROLOGICAL: The patient is oriented to person, place and time. Strength and sensation are grossly intact. Face is symmetric.            LABS:   Recent Results (from the past 168 hour(s))   Basic Metabolic Panel   Result Value Ref Range    Sodium 141 136 - 145 mmol/L    Potassium 3.6 3.5 - 5.0 mmol/L    Chloride 109 (H) 98 - 107 mmol/L    CO2 25 22 - 31 mmol/L    Anion Gap, Calculation 7 5 - 18 mmol/L    Glucose 133 (H) 70 - 125 mg/dL    Calcium 11.0 (H) 8.5 - 10.5 mg/dL    BUN 21 8 - 28 mg/dL    Creatinine 1.12 0.70 - 1.30 mg/dL    GFR MDRD Af Amer >60 >60 mL/min/1.73m2    GFR MDRD Non Af Amer >60 >60 mL/min/1.73m2   HM1 (CBC with Diff)   Result Value Ref Range    WBC 8.7 4.0 - 11.0 thou/uL    RBC 3.00 (L) 4.40 - 6.20 mill/uL    Hemoglobin 9.3 (L) 14.0 - 18.0 g/dL    Hematocrit 30.5 (L) 40.0 - 54.0 %     (H) 80 - 100 fL    MCH 31.0 27.0 - 34.0 pg    MCHC 30.5 (L) 32.0 - 36.0 g/dL    RDW 16.3 (H) 11.0 - 14.5 %    Platelets 302 140 - 440 thou/uL    MPV 10.7 8.5 - 12.5 fL    Neutrophils % 81 (H) 50 - 70 %    Lymphocytes % 7 (L) 20 - 40 %    Monocytes % 9 2 - 10 %    Eosinophils % 2 0 - 6 %    Basophils % 1 0 - 2 %    Neutrophils Absolute 7.0 2.0 - 7.7 thou/uL    Lymphocytes Absolute 0.6 (L) 0.8 - 4.4 thou/uL    Monocytes Absolute 0.8 0.0 - 0.9 thou/uL    Eosinophils Absolute 0.2 0.0 - 0.4 thou/uL    Basophils Absolute 0.0 0.0 - 0.2 thou/uL     Results for orders placed or performed in visit on 08/07/18   Basic Metabolic Panel   Result Value Ref Range    Sodium 141 136 - 145 mmol/L    Potassium 3.6 3.5 - 5.0 mmol/L    Chloride 109 (H) 98 - 107 mmol/L    CO2 25 22 - 31 mmol/L    Anion Gap, Calculation 7 5 - 18 mmol/L    Glucose 133 (H) 70 - 125 mg/dL    Calcium 11.0 (H) 8.5 - 10.5 mg/dL    BUN 21 8 - 28 mg/dL    Creatinine 1.12 0.70 - 1.30 mg/dL     GFR MDRD Af Amer >60 >60 mL/min/1.73m2    GFR MDRD Non Af Amer >60 >60 mL/min/1.73m2         Lab Results   Component Value Date    WBC 8.7 08/07/2018    HGB 9.3 (L) 08/07/2018    HCT 30.5 (L) 08/07/2018     (H) 08/07/2018     08/07/2018       No results found for: [id number]  No results found for: HGBA1C  No results found for: INR, PROTIME  No results found for: [id number]  No results found for: TSH        ASSESSMENT/PLAN:    1. Right knee, femur fracture, S/P ORIF: Pain controlled. Tylenol  1000 mg Q 600 and Q8 PRN. Incision C/D/I.  Follow up with ortho 8/6-X-rays stable, continue nonweightbearing, active and passive range of motion, okay for PT exercises twice-daily and strengthening. Follow up again on 9/18. On warfarin.   2.  Bradycardia, S/P PPM: incision C/D/I.  No pain or dizziness. Pacer check 8/31.  3. Type II DM: diet controlled.  Accu checks daily 124 - 131  4.  Atrial fibrillation: rate controlled in 60s. On metoprolol and warfarin.   5.  HTN: sBP's 100s.  On amlodipine, Lasix, metoprolol.    6.  Hyperparathyroidism: stable.  7. PMR: No concerns today.  8. IBS: On senna - docusate PRN, will order Imodium PRN. Bowels moving regularly right now. bm today  9. Gerd: on omeprazole.   10. Hypothyroid: on levothyroxine.  11. Gout: on probenacid.  12. Chronic diastolic heart failure: daily weights 198 - [phone].  On Lasix 40 daily.  No shortness of breath and no edema today. monitor      Electronically signed by: Daily Landaverde NP    Total 35 minutes of which 75% was spent in counseling and coordination of care of the above plan    Time spent in interview and examination of patient, review of available records, and discussion with nursing staff. Continue care plan, efforts at therapy, and monitor nutritional status.

## 2021-06-19 NOTE — PROGRESS NOTES
[facility]    DATE: 2018    NAME:  Hong Corley             :  1924  MRN: [id number]  CODE STATUS:  FULL CODE    VISIT TYPE: Problem Visit (pain)     FACILITY:  [facility] [[id number]]       CHIEF COMPLAIN/REASON FOR VISIT:    Chief Complaint   Patient presents with     Problem Visit     pain               HISTORY OF PRESENT ILLNESS: Hong Corley is a 89 y.o. male Who was admitted to [facility]   -  for Fall and Right femur fracture.  On  he had significant bradycardia and was taken for urgent PPM placement by cardiology on .  On  he went to the OR for right knee ORIF.  He did have acute blood loss anemia postop with hemoglobin in 7 range.  He has PMH of HTN, HLD, type II DM managed with diet, atrial fibrillation, on chronic warfarin, hyperparathyroidism, thrombocytopenia, PMR, osteoarthritis GERD, DJD.  Prior to this he lived at home with his wife at [facility]. He is a retired physician.     Today Mr. Corley states he does have pain in his right knee and leg but feels that it is getting better.  He says that there is a metal bar or something that Is  Sticking out and causing discomfort on the back of his leg. He thinks that he needs some sort of extra padding back here.  He says that he was in the bathroom when he fell at home and broke his femur. He does think that his femur is getting better and that it is healing.  He does not think that the Tylenol does a whole lot for his pain but he does not want anything stronger.  He says that the staff try to get him to take it before therapy but he doesn't think it makes a difference.  He is willing to take a scheduled dose in the morning just to make everybody else happy.  He denies having any swelling in his legs but he admits he is not really getting out of bed right now. He denies any dizziness or lightheadedness but again he is not getting out of bed. He says he is not crazy  about the food they have been serving here but also just does not feel hungry. He just had a bowl of fruit for breakfast this morning.  He denies any numbness or tingling in his legs especially his right leg.  He says that he was concerned because some of his toenails on his right foot are abnormally shaped and he had clipped the one too short and cut his skin.  He was planning to see the podiatrist in September.  He says his bowels are moving regularly right now but he does have a history of irritable bowel syndrome. He has taken Imodium as needed for many years. He says when he was working as a physician he would carry around a little pouch with Tylenol and Imodium in it.  He says that he used to have a lot of back aches and pains.  He says that he and his wife have not had the best health recently.  She recently had a fall and was treated for sepsis and then was on antibiotics and came home but developped C. Diff.  She then had to return to the hospital. He says she is doing well now and she is renting an apartment in the Independent living while he is here so that she can be close to him.  She is staying there for a couple nights a week.  He says when he is released from here they will return to their home in [place] but are planning to sell their home in the near future and move into a senior living or independent living. They have a place in mind but it did not have any openings right now.  He denies any shortness of breath or coughing and has not had any chest pain or pressure.  He denies any headaches or other medical concerns at this time.  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  He would follow his patients at whatever hospital they were admitted to.  So after his full clinic day he would round at the different hospitals and then may make a few house calls before returning home.  He says he used to be home in the evenings after his  kids were already in bed.  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  He worked with industrial engineers, toxicologists, medical researchers, and more on medical research. He said this job would bring him all over the world and many opportunities.  He worked at this job until he retired in 1989.  He says he and his wife and 4 children and 3 of them live locally and one is in [place]. He says 2 of his kids who live locally have been traveling recently but they will be all home starting next week so then he will have more help available for when he returns home.  Nursing reports he has been having pain in therapy and requesting a scheduled dose of Tylenol around 6 AM before he does therapy. Otherwise his vitals have been stable and no acute concerns.  He did have an INR today that was 4.2.    REVIEW OF SYSTEMS:  PROBLEMS AND REVIEW OF SYSTEMS:   Review of Systems  Today on ROS:  Currently, no fever, chills, or rigors. Decreased vision and hearing. Denies any chest pain, headaches, palpitations, lightheadedness, dizziness, shortness of breath, or cough. Appetite is good. Denies any GERD symptoms. Denies any difficulty with swallowing, nausea, or vomiting.  Denies any abdominal pain, diarrhea or constipation. Denies any urinary symptoms. No insomnia. No active bleeding. No rash. Positive for right leg incision, the immobilizer, right knee and leg pain, irritation from immobilizer      Allergies   Allergen Reactions     Losartan Rash     Low platelets (severe)     Current Outpatient Prescriptions   Medication Sig     acetaminophen (TYLENOL) 500 MG tablet Take 1,000 mg by mouth every 8 (eight) hours as needed for pain.     amLODIPine (NORVASC) 5 MG tablet Take 5 mg by mouth daily.     furosemide (LASIX) 20 MG tablet Take 40 mg by mouth every morning.     levothyroxine (SYNTHROID, LEVOTHROID) 137 MCG tablet Take 137 mcg by mouth Daily before breakfast.      metoprolol succinate (TOPROL-XL) 100 MG 24 hr tablet Take 100 mg by mouth daily.     omeprazole (PRILOSEC) 20 MG capsule Take 40 mg by mouth every other day.      probenecid (BENEMID) 500 mg tablet Take 500 mg by mouth daily.     senna-docusate (SENNOSIDES-DOCUSATE SODIUM) 8.6-50 mg tablet Take 2 tablets by mouth 2 (two) times a day as needed for constipation.     warfarin (COUMADIN) 2 MG tablet Take 2 mg by mouth daily.      Past Medical History:    Past Medical History:   Diagnosis Date     Aortic stenosis      Atrial fibrillation (H)      Atrial tachycardia (H)      Bladder tumor      BPH (benign prostatic hyperplasia)      Bradycardia      CHF (congestive heart failure) (H)      Closed fracture of right distal femur (H)      Colon polyps      Diabetes mellitus (H)      DJD (degenerative joint disease)      GERD (gastroesophageal reflux disease)      Gout      Hx of polymyalgia rheumatica      Hyperlipidemia      Hyperparathyroidism (H)      Hypertension      Hypothyroidism      Meningioma (H)     Spine     Monoclonal gammopathy of undetermined significance      Peripheral neuropathy (H)      Prostate cancer (H)      Pulmonary fibrosis (H)      Sinus arrest      Thrombocytopenia (H)     secondary to losartan           PHYSICAL EXAMINATION  Vitals:    08/01/18 2211   BP: 127/59   Pulse: 61   Resp: 18   Temp: 97.8  F (36.6  C)   SpO2: 99%   Weight: 195 lb (88.5 kg)       Today on physical exam:    GENERAL: Awake, Alert, oriented x3, not in any form of acute distress, answers questions appropriately, follows simple commands, conversant  HEENT: Head is normocephalic with normal hair distribution. No evidence of trauma. Ears: No acute purulent discharge. Eyes: Conjunctivae pink with no scleral jaundice. Nose: Normal mucosa and septum. NECK: Supple with no cervical or supraclavicular lymphadenopathy. Trachea is midline.   CHEST: No tenderness or deformity, no crepitus  LUNG: dim to auscultation with good chest  expansion. There are no crackles or wheezes, normal AP diameter.  BACK: No kyphosis of the thoracic spine. Symmetric, no curvature, ROM normal, no CVA tenderness, no spinal tenderness   CVS: There is good S1  S2, regular rhythm, there are no murmurs, rubs, gallops, or heaves,  2+ pulses symmetric in all extremities.  ABDOMEN: Rounded and soft, nontender to palpation, non distended, no masses, no organomegaly, good bowel sounds, no rebound or guarding, no peritoneal signs.   EXTREMITIES: No pedal edema, right leg incision C/D/I, knee immobilizer in place  SKIN: Warm and dry, no erythema noted.  Skin color, texture, no rashes or lesions.  NEUROLOGICAL: The patient is oriented to person, place and time. Strength and sensation are grossly intact. Face is symmetric.            LABS:   Recent Results (from the past 168 hour(s))   Hemoglobin   Result Value Ref Range    Hemoglobin 7.9 (L) 14.0 - 18.0 g/dL     No results found for this or any previous visit.      Lab Results   Component Value Date    HGB 7.9 (L) 07/31/2018       No results found for: [id number]  No results found for: HGBA1C  No results found for: INR, PROTIME  No results found for: [id number]  No results found for: TSH        ASSESSMENT/PLAN:    1. Right knee, femur fracture, S/P ORIF: Pain controlled. Tylenol PRN - will change to 1000 mg Q 600 and Q8 PRN. Incision C/D/I.  Follow up with ortho in 2 weeks. On warfarin.   2.  Bradycardia, S/P PPM: incision C/D/I.  No pain or dizziness. Pacer check 8/31.  3. Type II DM: diet controlled. Will order Accu checks daily.  4.  Atrial fibrillation: rate controlled in 60s. On metoprolol and warfarin.  INR today 4.2, hold Coumadin and INR tomorrow.  5.  HTN: sBP's 120s.  On amlodipine, Lasix, metoprolol.  Added hold parameters.  6.  Hyperparathyroidism: bmP on 8/7  7. PMR: No concerns today.  8. IBS: On senna - docusate PRN, will order Imodium PRN. Bowels moving regularly right now. bm today  9. Gerd: on omeprazole.    10. Hypothyroid: on levothyroxine.  11. Gout: on probenacid.  12. Chronic diastolic heart failure: daily weights 198 - 195LBS.  On Lasix 40 daily.  No shortness of breath and no edema today. monitor    BMP,  one on 8/7 was previously ordered      Electronically signed by: Daily Landaverde NP    Total 60 minutes of which 75% was spent in counseling and coordination of care of the above plan    Time spent in interview and examination of patient, review of available records, and discussion with nursing staff. Continue care plan, efforts at therapy, and monitor nutritional status.

## 2021-06-19 NOTE — PROGRESS NOTES
[facility]    Facility:   [facility] [[id number]]   Code Status: FULL CODE      CHIEF COMPLAINT/REASON FOR VISIT:  Chief Complaint   Patient presents with     Review Of Multiple Medical Conditions       HISTORY:      HPI: Hong is a 89 y.o. male who was most recently hospitalized after a fall resulting in distal right femur fracture.  He needs to be nonweightbearing on his right leg.  His hemoglobin on 7/23 was 9.4.  This continued to drop to 7.6 on 7/27, 7.5 on 7/28, and 7.4 on 7/29.  He does receive Coumadin for atrial fibrillation.  He had a pacemaker placed on 7/22.     He has a significant past medical history for bovine aortic valve replacement in 2002, prostate cancer for which he received radiation therapy in May 2017, pulmonary fibrosis, type 2 diabetes mellitus controlled by diet and retention, history of hyperparathyroidism, hypothyroidism on thyroid replacement therapy, GERD, DJD/osteoarthritis, status post resection of spinal meningioma in the thoracic region, PMR, history of thrombocytopenia and history of gout.    Upon current review of systems, when I brought to his attention the depression score of 18 on his PHQ 9, he states that at his age he thinks about the end of his life all the time and realizes that it is near the end, but he does not want to use any type of medication for depression.  I brought up the low blood pressure reading and he started talking about the thrombocytopenia that occurred with losartan.  Explained that his average systolic blood pressure is been 120.  He is not feeling lightheaded.  He does have weakness in general.  He is not having chest pain or palpitations of the heart.  He is not having cough or shortness of breath.  Is not having abdominal pain or nausea.    Past Medical History:   Diagnosis Date     Aortic stenosis      Atrial fibrillation (H)      Atrial tachycardia (H)      Bladder tumor      BPH (benign prostatic  hyperplasia)      Bradycardia      CHF (congestive heart failure) (H)      Closed fracture of right distal femur (H)      Colon polyps      Diabetes mellitus (H)      DJD (degenerative joint disease)      GERD (gastroesophageal reflux disease)      Gout      Hx of polymyalgia rheumatica      Hyperlipidemia      Hyperparathyroidism (H)      Hypertension      Hypothyroidism      Meningioma (H)     Spine     Monoclonal gammopathy of undetermined significance      Peripheral neuropathy (H)      Prostate cancer (H)      Pulmonary fibrosis (H)      Sinus arrest      Thrombocytopenia (H)     secondary to losartan             Family History   Problem Relation Age of Onset     Stomach cancer Father      Prostate cancer Maternal Uncle      Kidney failure Sister      Social History     Social History     Marital status:      Spouse name: N/A     Number of children: N/A     Years of education: N/A     Social History Main Topics     Smoking status: Former Smoker     Packs/day: 0.80     Types: Cigarettes     Quit date: 5/14/1980     Smokeless tobacco: Never Used     Alcohol use No     Drug use: No     Sexual activity: Not Currently     Other Topics Concern     Not on file     Social History Narrative         Review of Systems    .  Vitals:    08/07/18 0649   BP: 96/54   Pulse: 60   Resp: 16   Temp: 97.7  F (36.5  C)   SpO2: 98%       Physical Exam   Constitutional: No distress.   Pale   Eyes:   Pale mucosa on the undersurface of the lower eyelids   Neck: No JVD present.   Cardiovascular: Normal heart sounds.    Pulmonary/Chest: Breath sounds normal. No respiratory distress.   Abdominal: Bowel sounds are normal. There is no tenderness.   Neurological: He is alert.   Psychiatric: He has a normal mood and affect.   Nursing note and vitals reviewed.        LABS:   Blood glucose is 130    ASSESSMENT:      ICD-10-CM    1. Closed fracture of distal end of right femur with routine healing, unspecified fracture morphology,  subsequent encounter S72.401D    2. Acute blood loss anemia D62    3. Essential hypertension I10    4. Type 2 diabetes mellitus without complication, without long-term current use of insulin E11.9    5. Pacemaker Z95.0    6. Prostate cancer (H) C61        PLAN:    Amlodipine was decreased to 2.5 mg daily from current 5 mg daily and he is in agreement with this plan.  Continue to monitor hemoglobin and other medical conditions.  Continue with therapies as able.      Electronically signed by: Akshat Ojeda MD

## 2021-06-19 NOTE — PROGRESS NOTES
[facility]      Facility:    [facility] [[id number]]  Code Status: FULL CODE      Chief Complaint/Reason for Visit:  Chief Complaint   Patient presents with     H & P       HPI:   Hong is a 89 y.o. male who was most recently hospitalized after a fall resulting in distal right femur fracture.  He needs to be nonweightbearing on his right leg.  His hemoglobin on 7/23 was 9.4.  This continued to drop to 7.6 on 7/27, 7.5 on 7/28, and 7.4 on 7/29.  He does receive Coumadin for atrial fibrillation.  He had a pacemaker placed on 7/22.    He has a significant past medical history for bovine aortic valve replacement in 2002, prostate cancer for which he received radiation therapy in May 2017, pulmonary fibrosis, type 2 diabetes mellitus controlled by diet and retention, history of hyperparathyroidism, hypothyroidism on thyroid replacement therapy, GERD, DJD/osteoarthritis, status post resection of spinal meningioma in the thoracic region, PMR, history of thrombocytopenia and history of gout.    Upon current review of systems, he does not have specific complaints of fevers or chills nor sore throat nor cough or shortness of breath or chest pain or abdominal pain or nausea nor dysuria.  Upon review of hospital records, the family has noticed increasing slurred speech and confusion especially in the last couple of weeks and had wondered about a subacute stroke, but testing in the hospital did not confirm.  There was recent concern about depression and Wellbutrin was added to his medications but he discontinued this on his own.  The slurred speech had been noticed over the last 1-1/2 years.    Past Medical History:  Past Medical History:   Diagnosis Date     Aortic stenosis      Atrial fibrillation (H)      Atrial tachycardia (H)      Bladder tumor      BPH (benign prostatic hyperplasia)      Bradycardia      CHF (congestive heart failure) (H)      Closed fracture of right distal  femur (H)      Colon polyps      Diabetes mellitus (H)      DJD (degenerative joint disease)      GERD (gastroesophageal reflux disease)      Gout      Hx of polymyalgia rheumatica      Hyperlipidemia      Hyperparathyroidism (H)      Hypertension      Hypothyroidism      Meningioma (H)     Spine     Monoclonal gammopathy of undetermined significance      Peripheral neuropathy (H)      Prostate cancer (H)      Pulmonary fibrosis (H)      Sinus arrest      Thrombocytopenia (H)     secondary to losartan           Surgical History:  Past Surgical History:   Procedure Laterality Date     AORTIC VALVE REPLACEMENT  2002     APPENDECTOMY       CARPAL TUNNEL RELEASE       ORIF DISTAL FEMUR FRACTURE Right 07/24/2018     RECTAL SURGERY       THORACIC LAMINECTOMY  04/24/2013    T4-T6 laminectomy, removal of meningioma, decompression of spinal cord      THYROID SURGERY       TONSILLECTOMY AND ADENOIDECTOMY       TOTAL KNEE ARTHROPLASTY Bilateral      TRANSURETHRAL RESECTION OF BLADDER TUMOR  06/03/2016     TRANSURETHRAL RESECTION OF PROSTATE         Family History:   Family History   Problem Relation Age of Onset     Stomach cancer Father      Prostate cancer Maternal Uncle      Kidney failure Sister        Social History:    Social History     Social History     Marital status:      Spouse name: N/A     Number of children: N/A     Years of education: N/A     Social History Main Topics     Smoking status: Former Smoker     Packs/day: 0.80     Types: Cigarettes     Quit date: 5/14/1980     Smokeless tobacco: Never Used     Alcohol use No     Drug use: No     Sexual activity: Not Currently     Other Topics Concern     Not on file     Social History Narrative          Review of Systems   All other systems reviewed and are negative.      Vitals:    07/31/18 0830   BP: 115/64   Pulse: 77   Resp: 16   Temp: 98.4  F (36.9  C)   SpO2: 97%       Physical Exam   Constitutional: No distress.   HENT:   Mouth/Throat: Oropharynx is  clear and moist.   Eyes: Right eye exhibits no discharge. Left eye exhibits no discharge.   Neck: No JVD present. No thyromegaly present.   Cardiovascular: Normal heart sounds.    Irregular rhythm   Pulmonary/Chest: Breath sounds normal. No respiratory distress.   Abdominal: Soft. Bowel sounds are normal. He exhibits no distension. There is no tenderness.   Lymphadenopathy:     He has no cervical adenopathy.   Neurological:   He is alert, but of interest is that he repeats himself during the time of the examination.   Skin: Skin is warm and dry.   Psychiatric: He has a normal mood and affect.   Nursing note and vitals reviewed.      Medication List:  Current Outpatient Prescriptions   Medication Sig     acetaminophen (TYLENOL) 500 MG tablet Take 1,000 mg by mouth every 8 (eight) hours as needed for pain.     amLODIPine (NORVASC) 5 MG tablet Take 5 mg by mouth daily.     furosemide (LASIX) 20 MG tablet Take 40 mg by mouth every morning.     levothyroxine (SYNTHROID, LEVOTHROID) 137 MCG tablet Take 137 mcg by mouth Daily before breakfast.     metoprolol succinate (TOPROL-XL) 100 MG 24 hr tablet Take 100 mg by mouth daily.     omeprazole (PRILOSEC) 20 MG capsule Take 40 mg by mouth every other day.      probenecid (BENEMID) 500 mg tablet Take 500 mg by mouth daily.     senna-docusate (SENNOSIDES-DOCUSATE SODIUM) 8.6-50 mg tablet Take 2 tablets by mouth 2 (two) times a day as needed for constipation.     warfarin (COUMADIN) 2 MG tablet Take 2 mg by mouth daily.        Labs:  Hemoglobin 7.9.  INR 3.2.    Assessment:    ICD-10-CM    1. Closed fracture of distal end of right femur with routine healing, unspecified fracture morphology, subsequent encounter S72.401D    2. Pacemaker Z95.0    3. Chronic atrial fibrillation (H) I48.2    4. Anemia, unspecified type D64.9    5. Prostate cancer (H) C61    6. Gastroesophageal reflux disease, esophagitis presence not specified K21.9    7. Chronic gout without tophus, unspecified  cause, unspecified site M1A.9XX0    8. Pulmonary fibrosis (H) J84.10    9. Type 2 diabetes mellitus without complication, without long-term current use of insulin E11.9    10. Acquired hypothyroidism E03.9    11. H/O hyperparathyroidism Z86.39    12. Hypertension I10        Plan:  Coumadin to be continued at 2 mg daily.  Recheck INR on Thursday, 8/2 and hemoglobin on the same day as well.  Even though the hemoglobin is low it shows signs of rising.  Therapies will be instituted for strengthening and transfers maintaining the nonweightbearing status of the injured leg.  Continue to avoid elevation of left arm for 6 weeks after the recent pacemaker placement.      Electronically signed by: Akshat Ojeda MD

## 2021-06-19 NOTE — PROGRESS NOTES
[facility]    DATE: 2018    NAME:  Hong Corley             :  1924  MRN: [id number]  CODE STATUS:  FULL CODE    VISIT TYPE: Review Of Multiple Medical Conditions     FACILITY:  [facility] [[id number]]       CHIEF COMPLAIN/REASON FOR VISIT:    Chief Complaint   Patient presents with     Review Of Multiple Medical Conditions               HISTORY OF PRESENT ILLNESS: Hong Corley is a 89 y.o. male Who was admitted to [facility]   -  for Fall and Right femur fracture.  On  he had significant bradycardia and was taken for urgent PPM placement by cardiology on .  On  he went to the OR for right knee ORIF.  He did have acute blood loss anemia postop with hemoglobin in 7 range.  He has PMH of HTN, HLD, type II DM managed with diet, atrial fibrillation, on chronic warfarin, hyperparathyroidism, thrombocytopenia, PMR, osteoarthritis GERD, DJD.  Prior to this he lived at home with his wife at [facility]. He is a retired physician.     Today Mr. Corley states He went to see ortho this morning and they said his x-rays looked good.  He says everything is healing but he has to continue being non weightbearing on his right leg for another 6 weeks.  He will mangle back on  for more x-rays and appointment.  He does have orders for PT exercises including active and passive range of motion and strengthening. He says they felt his incisions were all healing well and had no other concerns.  He says the irritation from his immobilizer is better with the extra padding. He says he needs to use the bedpan so visit was cut short.  Nursing staff report his vitals have been stable and blood sugars running 126 - 131.  His weight is down a little bit 198 - 194 LBS.  They report no new concerns with him but his INR today was 1.9.    REVIEW OF SYSTEMS:  PROBLEMS AND REVIEW OF SYSTEMS:   Review of Systems  Today on ROS:  Currently, no fever, chills, or  rigors. Decreased vision and hearing. Denies any chest pain, headaches, palpitations, lightheadedness, dizziness, shortness of breath, or cough. Appetite is good. Denies any GERD symptoms. Denies any difficulty with swallowing, nausea, or vomiting.  Denies any abdominal pain, diarrhea or constipation. Denies any urinary symptoms. No insomnia. No active bleeding. No rash. Positive for right leg incision, knee immobilizer, right knee and leg pain      Allergies   Allergen Reactions     Losartan Rash     Low platelets (severe)     Current Outpatient Prescriptions   Medication Sig     acetaminophen (TYLENOL) 500 MG tablet Take 1,000 mg by mouth every 8 (eight) hours as needed for pain. At 0600 scheduled     amLODIPine (NORVASC) 5 MG tablet Take 5 mg by mouth daily.     furosemide (LASIX) 20 MG tablet Take 40 mg by mouth every morning.     levothyroxine (SYNTHROID, LEVOTHROID) 137 MCG tablet Take 137 mcg by mouth Daily before breakfast.     loperamide (IMODIUM) 2 mg capsule Take 2 mg by mouth 4 (four) times a day as needed for diarrhea.     metoprolol succinate (TOPROL-XL) 100 MG 24 hr tablet Take 100 mg by mouth daily.     omeprazole (PRILOSEC) 20 MG capsule Take 40 mg by mouth every other day.      probenecid (BENEMID) 500 mg tablet Take 500 mg by mouth daily.     senna-docusate (SENNOSIDES-DOCUSATE SODIUM) 8.6-50 mg tablet Take 2 tablets by mouth 2 (two) times a day as needed for constipation.     warfarin (COUMADIN) 2 MG tablet Take 2 mg by mouth daily.      Past Medical History:    Past Medical History:   Diagnosis Date     Aortic stenosis      Atrial fibrillation (H)      Atrial tachycardia (H)      Bladder tumor      BPH (benign prostatic hyperplasia)      Bradycardia      CHF (congestive heart failure) (H)      Closed fracture of right distal femur (H)      Colon polyps      Diabetes mellitus (H)      DJD (degenerative joint disease)      GERD (gastroesophageal reflux disease)      Gout      Hx of polymyalgia  rheumatica      Hyperlipidemia      Hyperparathyroidism (H)      Hypertension      Hypothyroidism      Meningioma (H)     Spine     Monoclonal gammopathy of undetermined significance      Peripheral neuropathy (H)      Prostate cancer (H)      Pulmonary fibrosis (H)      Sinus arrest      Thrombocytopenia (H)     secondary to losartan           PHYSICAL EXAMINATION  Vitals:    08/05/18 2245   BP: 147/64   Pulse: 60   Resp: 15   Temp: 98  F (36.7  C)   SpO2: 96%   Weight: 194 lb (88 kg)       Today on physical exam:    GENERAL: Awake, Alert, oriented x3, not in any form of acute distress, answers questions appropriately, follows simple commands, conversant  HEENT: Head is normocephalic with normal hair distribution. No evidence of trauma. Ears: No acute purulent discharge. Eyes: Conjunctivae pink with no scleral jaundice. Nose: Normal mucosa and septum. NECK: Supple with no cervical or supraclavicular lymphadenopathy. Trachea is midline.   CHEST: No tenderness or deformity, no crepitus  LUNG: dim to auscultation with good chest expansion. There are no crackles or wheezes, normal AP diameter.  BACK: No kyphosis of the thoracic spine. Symmetric, no curvature, ROM normal, no CVA tenderness, no spinal tenderness   CVS: There is good S1  S2, regular rhythm, there are no murmurs, rubs, gallops, or heaves,  2+ pulses symmetric in all extremities.  ABDOMEN: Rounded and soft, nontender to palpation, non distended, no masses, no organomegaly, good bowel sounds, no rebound or guarding, no peritoneal signs.   EXTREMITIES: No pedal edema, right leg incision C/D/I, knee immobilizer in place  SKIN: Warm and dry, no erythema noted.  Skin color, texture, no rashes or lesions.  NEUROLOGICAL: The patient is oriented to person, place and time. Strength and sensation are grossly intact. Face is symmetric.            LABS:   Recent Results (from the past 168 hour(s))   Hemoglobin   Result Value Ref Range    Hemoglobin 7.9 (L) 14.0 - 18.0  g/dL     No results found for this or any previous visit.      Lab Results   Component Value Date    HGB 7.9 (L) 07/31/2018       No results found for: [id number]  No results found for: HGBA1C  No results found for: INR, PROTIME  No results found for: [id number]  No results found for: TSH        ASSESSMENT/PLAN:    1. Right knee, femur fracture, S/P ORIF: Pain controlled. Tylenol  1000 mg Q 600 and Q8 PRN. Incision C/D/I.  Follow up with ortho 8/6-X-rays stable,continue nonweightbearing, active and passive range of motion, okay for PT exercises twice-daily and strengthening. Follow up again on 9/18. On warfarin.   2.  Bradycardia, S/P PPM: incision C/D/I.  No pain or dizziness. Pacer check 8/31.  3. Type II DM: diet controlled.  Accu checks daily 126 - 131  4.  Atrial fibrillation: rate controlled in 60s. On metoprolol and warfarin.  INR 1.9 today, continue 1 mg PO daily and INR 8/10  5.  HTN: sBP's 140s.  On amlodipine, Lasix, metoprolol.    6.  Hyperparathyroidism: bmP on 8/7  7. PMR: No concerns today.  8. IBS: On senna - docusate PRN, will order Imodium PRN. Bowels moving regularly right now. bm today  9. Gerd: on omeprazole.   10. Hypothyroid: on levothyroxine.  11. Gout: on probenacid.  12. Chronic diastolic heart failure: daily weights 198 - 195-194LBS.  On Lasix 40 daily.  No shortness of breath and no edema today. monitor    BMP, HM one on 8/7     per therapy PT: bed mob max A of 2, isaiah t/f, sitting bal min/mod A, poor sitting bal. OT:  UB setup seated, total A LB dressing and cares, toileting total A, grooming/hyg setup at sink.  Recommending 3 - 4 weeks      Electronically signed by: Daily Landaverde NP    Total 25 minutes of which 75% was spent in counseling and coordination of care of the above plan    Time spent in interview and examination of patient, review of available records, and discussion with nursing staff. Continue care plan, efforts at therapy, and monitor nutritional  status.

## 2021-07-01 NOTE — CONSULTS
Consults by Juanis Cohen MD at 2/17/2017  8:30 AM     Author: Juanis Cohen MD Service: -- Author Type: Physician    Filed: 2/17/2017  3:04 PM Encounter Date: 2/17/2017 Status: Signed    : Juanis Cohen MD (Physician)       [facility] Radiation Oncology Consult Note    Patient: Hong Corley  MRN: [id number]  Date of Service: 02/17/2017      Assessment / Impression     1. Prostate cancer       Prostate cancer    Staging form: Prostate, AJCC 7th Edition      Clinical stage from 1/10/2017: Stage IIB (T1c, N0, M0, PSA: Less than 10, Adelita 8-10) - Signed by Juanis Cohen MD on 2/17/2017      Plan:     Prostate cancer:  Hong is a very challenging case.  The challenges set by technical definition he has a very high-grade prostate cancer with [place] say tumor that was resected initially in June and was papillary in nature.  But on subsequent biopsy and TURP more cancer was found some of which was [place]'s 8 in the left gland.  The patient has a significant TURP defect at this time as noted on post procedure imaging.  His prostate gland with small.  By general criteria the patient should be treated however when considering his age and benefits of treatment are less clear.  His PSA is very low and would generally indicate a lower risk situation if it were not for the [place]'s 7 and 8 tumor.    Although the patient presented with hematuria he has not had symptoms since his initial TURP.  Given his age his life expectancy is somewhere between 3 and 4.5 years according Social Security life tables.  Given his age and the fact that he has had a TURP and has a significant defect the probability of side effects increases.  At the same time the probability of benefit from treatment decreases.    Portable this is a challenging case and I think that it would be reasonable to treat him I also think it would be very reasonable to monitor his PSA and prostate MRI in 6 months time to rule out  significant disease progression per.  We'll prostate MRIs are unlikely to find low-grade disease they do sometimes identify higher grade disease.      Patient is going to consider his treatment options.  However if he does decide on treatment I do think that stereotactic radiation therapy would be a good option.    Face to face time  60 minutes with  75% spent on consultation, education and coordination of care     Subjective:      HPI: Hong Corley is a 89 y.o. male with a history of gross hematuria presenting in May 2016.  The patient had a cystoscopy on May 19, 2016 which revealed a 5 mm tumor in the left prostatic fossa that was bleeding.  This was removed and pathology was sent to [facility].  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  There was no invasive component below the urethral tumor noted, without the papillary lesion.  The [facility] report suggested that because the minute size and apparently complete removal that a better clinical prognosis that generally noted for this [place]'s score would be expected.  In July 2016 his PSA was 1.18.    The patient underwent a transrectal ultrasound biopsy of the prostate and transurethral resection of the prostate on January 10, 2017.  The prostate was 34.4 mm x 21.9 mm x 38.8 mm is a 14.8 g.  He has a large TUR defect that was easily seen on ultrasound.  Cystoscopy demonstrated a normal urethra.  In addition he had moderate trabeculation within the bladder and several bladder diverticuli.  Pathology from the transurethral resection showed a Adelita 7 pattern for +3 adenocarcinoma of an acinar subtype involving 5-10% of the surface area in 3 out of 6 tissue samples.  There was no perineural invasion noted.  The needle biopsies were negative on the right but on the left the single needle biopsy showed [place]'s 8 tumor involving 30% of the biopsy tissue.    He subsequently underwent  a bone scan on January 31, 2017 which showed no evidence of metastatic disease and a CT scan on January 31 of the abdomen and pelvis which showed no evidence of lymph node involvement.  This CT was read out as post prostatectomy, so a significant transurethral resection was accomplished.    The patient was referred by [facility] to Dr. Vieyra [place] oncology for consideration of radiation therapy.  However he was most interested in extremely hyperfractionated radiation therapy and is sent here for consideration of treatment options.    Chief Complaint   Patient presents with   ? HE Cancer     [employer] Consult   .    Current Outpatient Prescriptions   Medication Sig Dispense Refill   ? acetaminophen (TYLENOL) 325 MG tablet Take 325 mg by mouth every 6 (six) hours as needed.     ? amLODIPine (NORVASC) 5 MG tablet Take 5 mg by mouth daily.     ? atenolol (TENORMIN) 50 MG tablet Take 50 mg by mouth daily.     ? furosemide (LASIX) 20 MG tablet Take 40 mg by mouth every morning.     ? HYDROcodone-acetaminophen 5-325 mg per tablet Take 1 tablet by mouth every 6 (six) hours as needed.     ? levothyroxine (SYNTHROID, LEVOTHROID) 137 MCG tablet Take 137 mcg by mouth Daily before breakfast.     ? omeprazole (PRILOSEC) 20 MG capsule Take 20 mg by mouth every other day.     ? probenecid (BENEMID) 500 mg tablet Take 500 mg by mouth daily.     ? warfarin (COUMADIN) 2 MG tablet Take 1 tablet by mouth daily.       No current facility-administered medications for this visit.      Past Medical History:   Diagnosis Date   ? Aortic stenosis    ? Atrial fibrillation    ? Atrial tachycardia    ? BPH (benign prostatic hyperplasia)    ? CHF (congestive heart failure)    ? Colon polyps    ? Diabetes mellitus    ? DJD (degenerative joint disease)    ? GERD (gastroesophageal reflux disease)    ? Gout    ? Hx of polymyalgia rheumatica    ? Hyperlipidemia    ? Hyperparathyroidism    ? Hypertension    ? Hypothyroidism    ? Meningioma      Spine   ? Monoclonal gammopathy of undetermined significance    ? Peripheral neuropathy    ? Prostate cancer    ? Pulmonary fibrosis    ? 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 AORTIC VALVE REPLACEMENT  2002   ? APPENDECTOMY     ? spine meningioma resection     ? THYROID SURGERY       Losartan  Family History   Problem Relation Age of Onset   ? Stomach cancer Father    ? Prostate cancer Maternal Uncle      Social History     Social History   ? Marital status:      Spouse name: N/A   ? Number of children: N/A   ? Years of education: N/A     Occupational History   ? Not on file.     Social History Main Topics   ? Smoking status: Former Smoker   ? Smokeless tobacco: Former User     Quit date: 2/17/1980   ? Alcohol use No   ? Drug use: No   ? Sexual activity: Not on file     Other Topics Concern   ? Not on file     Social History Narrative   ? No narrative on file           Review of Systems    General  General (WDL): All general elements are within defined limits  ENT  ENT (WDL): Exceptions to WDL  Glasses or Contacts: Yes - Chronic (Greater than 3 months)  Hearing loss: Yes - Chronic (Greater than 3 months)  Hearing Aids: Yes - Chronic (Greater than 3 months)  Sinus Congestion/Drainage: Yes - Recent (Less than 3 months)  Respiratory  Respiratory (WDL): Exceptions to WDL  Dyspnea: Yes - Chronic (Greater than 3 months)  Cardiovascular  Cardiovascular (WDL): Exceptions to WDL  Irregular Heart Beat: Yes - Chronic (Greater than 3 months)  Lightheadedness: Yes - Recent (Less than 3 months)  Endocrine  Endocrine (WDL): All endocrine elements are within defined limits  Gastrointestinal  Gastrointestinal (WDL): All gastrointestinal elements are within defined limits  Musculoskeletal  Musculoskeletal (WDL): Exceptions to WDL  Back Pain: Yes - Chronic (Greater than 3 months)  Assistive device: Yes - Chronic (Greater than 3 months)  (Cane)  Neurological  Neurological (WDL): Exceptions to WDL  Difficulty walking: Yes - Recent (Less than 3 months)  Dominant Hand: Right  Difficulty with Balance: Yes - Recent (Less than 3 months)  Psychological/Emotional  Psychological/Emotional (WDL): Exceptions to WDL  Daytime sleepiness: Yes - Recent (Less than 3 months)  Hematological/Lymphatic  Hematological/Lymphatic (WDL): All hematological/lymphatic elements are within defined limits  Dermatological  Dermatologic (WDL): All dermatological elements are within defined limits  Genitourinary/Reproductive  Genitourinary/Reproductive (WDL): Exceptions to WDL  Urinary Frequency: Yes - Recent (Less than 3 months)  Blood in urine: Yes - Recent (Less than 3 months) (x 1)  Reproductive (Females only)     Pain  Currently in Pain: No/denies      Objective:      Physical Exam      General appearance: alert, appears stated age and cooperative  Head: Normocephalic, without obvious abnormality, atraumatic  Eyes: negative findings: lids and lashes normal, conjunctivae and sclerae normal and corneas clear  Neck: no adenopathy, supple, symmetrical, trachea midline and thyroid not enlarged, symmetric, no tenderness/mass/nodules  Lungs: clear to auscultation bilaterally  Heart: regular rate and rhythm, S1, S2 normal, no murmur, click, rub or gallop  Abdomen: soft, non-tender; bowel sounds normal; no masses,  no organomegaly  Male genitalia: normal  Rectal: Normal rectal tone.  No blood on the examining finger.  A butterfly shaped prostate with a significant cavity felt midline  Skin: Skin color, texture, turgor normal. No rashes or lesions  Lymph nodes: Cervical, supraclavicular, and axillary nodes normal.     Recent Labs: No results found for this or any previous visit (from the past 168 hour(s)).    Imaging:                   Pathology:                     Signed by: Juanis Cohen MD